# Patient Record
Sex: FEMALE | Race: WHITE | HISPANIC OR LATINO | Employment: FULL TIME | URBAN - METROPOLITAN AREA
[De-identification: names, ages, dates, MRNs, and addresses within clinical notes are randomized per-mention and may not be internally consistent; named-entity substitution may affect disease eponyms.]

---

## 2022-08-13 ENCOUNTER — OFFICE VISIT (OUTPATIENT)
Dept: FAMILY MEDICINE | Facility: CLINIC | Age: 27
End: 2022-08-13
Payer: COMMERCIAL

## 2022-08-13 VITALS
SYSTOLIC BLOOD PRESSURE: 112 MMHG | HEART RATE: 72 BPM | TEMPERATURE: 97.6 F | DIASTOLIC BLOOD PRESSURE: 74 MMHG | OXYGEN SATURATION: 97 %

## 2022-08-13 DIAGNOSIS — H10.33 ACUTE CONJUNCTIVITIS OF BOTH EYES, UNSPECIFIED ACUTE CONJUNCTIVITIS TYPE: Primary | ICD-10-CM

## 2022-08-13 PROCEDURE — 99203 OFFICE O/P NEW LOW 30 MIN: CPT

## 2022-08-13 RX ORDER — POLYMYXIN B SULFATE AND TRIMETHOPRIM 1; 10000 MG/ML; [USP'U]/ML
1-2 SOLUTION OPHTHALMIC EVERY 6 HOURS
Qty: 5 ML | Refills: 0 | Status: SHIPPED | OUTPATIENT
Start: 2022-08-13 | End: 2022-08-20

## 2022-08-13 ASSESSMENT — PAIN SCALES - GENERAL: PAINLEVEL: NO PAIN (0)

## 2022-08-13 NOTE — PROGRESS NOTES
Assessment & Plan     Acute conjunctivitis of both eyes, unspecified acute conjunctivitis type    - trimethoprim-polymyxin b (POLYTRIM) 16592-2.1 UNIT/ML-% ophthalmic solution; Place 1-2 drops into both eyes every 6 hours for 7 days      15 minutes spent on the date of the encounter doing patient visit and documentation        See Patient Instructions    Return in about 3 days (around 8/16/2022), or if symptoms worsen or fail to improve.    [facility]    Humberto Ayoub is a 27 year old presenting for the following health issues:  Conjunctivitis      HPI     Eye(s) Problem  Onset/Duration: 1 day  Description:   Location: right eye  Pain: No  Redness: YES  Accompanying Signs & Symptoms:  Discharge/mattering: YES  Swelling: YES  Visual changes: No  Fever: No  Nasal Congestion: YES  Bothered by bright lights: No  History:  Trauma: No  Foreign body exposure: No  Wearing contacts: No  Precipitating or alleviating factors: one of her kids has pink eye  Therapies tried and outcome: None      Review of Systems   Constitutional, HEENT, cardiovascular, pulmonary, gi and gu systems are negative, except as otherwise noted.      Objective    /74   Pulse 72   Temp 97.6  F (36.4  C)   SpO2 97%   There is no height or weight on file to calculate BMI.  Physical Exam   GENERAL: healthy, alert and no distress  EYES: PERRL, EOMI and conjunctiva/corneas- conjunctival injection OU  HENT: ear canals and TM's normal, nose and mouth without ulcers or lesions  NECK: no adenopathy                .  ..

## 2022-09-03 ENCOUNTER — OFFICE VISIT (OUTPATIENT)
Dept: FAMILY MEDICINE | Facility: CLINIC | Age: 27
End: 2022-09-03
Payer: COMMERCIAL

## 2022-09-03 VITALS
HEART RATE: 80 BPM | DIASTOLIC BLOOD PRESSURE: 64 MMHG | TEMPERATURE: 98.4 F | OXYGEN SATURATION: 98 % | SYSTOLIC BLOOD PRESSURE: 118 MMHG

## 2022-09-03 DIAGNOSIS — J30.1 SEASONAL ALLERGIC RHINITIS DUE TO POLLEN: Primary | ICD-10-CM

## 2022-09-03 PROCEDURE — 99213 OFFICE O/P EST LOW 20 MIN: CPT

## 2022-09-03 NOTE — PROGRESS NOTES
ICD-10-CM    1. Seasonal allergic rhinitis due to pollen  J30.1      Suspect ragweed trigger as of late.    PLAN:  Patient Instructions   Afrin nasal spray twice daily in both nostrils for 3 days.    Use fluticasone nasal spray or any other nasal steroid spray very morning about 5 minutes after using the Afrin (on those days that you are using Afrin).  2 sprays in both nostrils.  Do this for at least 2 weeks.    Can try switching to Allegra if desired or can try going back to Zyrtec.  Generic versions of these are just fine.    Zaditor (generic is just fine) eye drops:  follow package package instruction    If the above OTC options are not helping, encouraged pt to follow up with insurance to determine which allergy clinics would be in-network for her to follow up at.    SUBJECTIVE:  Anitra Forde is a 27 year old female who presents to  with worsening nasal congestion, facial pressure, and postnasal drainage.  She has a history of seasonal allergies, but loratadine is not working for her.  In the past she has used Zyrtec, but this stopped working well for her last year.  Eyes sometimes itchy and irritated.  Throat also gets itchy and irritated.    OBJECTIVE:  /64   Pulse 80   Temp 98.4  F (36.9  C) (Tympanic)   SpO2 98%   GEN: well-appearing, in NAD  ENT: TMs normal, nares with slightly enlarged lower turbinates bilaterally, no purulent drainage, oral MMM, There is cobblestoning of posterior pharynx.

## 2022-09-03 NOTE — PATIENT INSTRUCTIONS
Afrin nasal spray twice daily in both nostrils for 3 days.    Use fluticasone nasal spray or any other nasal steroid spray very morning about 5 minutes after using the Afrin (on those days that you are using Afrin).  2 sprays in both nostrils.  Do this for at least 2 weeks.    Can try switching to Allegra if desired or can try going back to Zyrtec.  Generic versions of these are just fine.    Zaditor (generic is just fine) eye drops:  follow package package instruction

## 2022-11-08 ENCOUNTER — HOSPITAL ENCOUNTER (EMERGENCY)
Facility: CLINIC | Age: 27
Discharge: LEFT BEFORE DISCHARGE | End: 2022-11-08
Payer: COMMERCIAL